# Patient Record
Sex: MALE | NOT HISPANIC OR LATINO | URBAN - METROPOLITAN AREA
[De-identification: names, ages, dates, MRNs, and addresses within clinical notes are randomized per-mention and may not be internally consistent; named-entity substitution may affect disease eponyms.]

---

## 2018-10-11 NOTE — PATIENT DISCUSSION
Cataract APPEARS VISUALLY SIGNIFICANT and patient advised to SEE MÓNICA GARCIA for evaluation and treatment.

## 2021-09-27 ENCOUNTER — NEW CATARACT EVAL (OUTPATIENT)
Dept: URBAN - METROPOLITAN AREA CLINIC 53 | Facility: CLINIC | Age: 81
End: 2021-09-27

## 2021-09-27 DIAGNOSIS — E11.9: ICD-10-CM

## 2021-09-27 DIAGNOSIS — H25.11: ICD-10-CM

## 2021-09-27 PROCEDURE — 99204 OFFICE O/P NEW MOD 45 MIN: CPT

## 2021-09-27 ASSESSMENT — VISUAL ACUITY
OS_CC: 20/40-1
OD_SC: 20/300
OS_GLARE: 20/30
OU_CC: 20/25-1
OS_GLARE: 20/25
OD_CC: 20/40-1
OS_SC: 20/100-1
OD_SC: J1+ @ 12IN
OS_PH: 20/60
OD_GLARE: 20/40
OD_PH: 20/80
OD_GLARE: 20/30

## 2021-09-27 ASSESSMENT — KERATOMETRY
OS_K1POWER_DIOPTERS: 42.50
OD_K1POWER_DIOPTERS: 42.75
OS_K2POWER_DIOPTERS: 43.50
OS_AXISANGLE_DEGREES: 117
OD_K2POWER_DIOPTERS: 43.25
OS_AXISANGLE2_DEGREES: 27
OD_AXISANGLE2_DEGREES: 43
OD_AXISANGLE_DEGREES: 133

## 2021-09-27 ASSESSMENT — TONOMETRY
OD_IOP_MMHG: 10
OS_IOP_MMHG: 10

## 2021-10-06 ENCOUNTER — BIOMETRY (OUTPATIENT)
Dept: URBAN - METROPOLITAN AREA CLINIC 52 | Facility: CLINIC | Age: 81
End: 2021-10-06

## 2021-10-06 DIAGNOSIS — H25.11: ICD-10-CM

## 2021-10-06 PROCEDURE — TOPOIOL CORNEAL TOPOGRAPHY-PREMIUM IOL

## 2021-10-06 PROCEDURE — 92136 OPHTHALMIC BIOMETRY: CPT

## 2021-10-06 ASSESSMENT — KERATOMETRY
OD_AXISANGLE_DEGREES: 023
OD_K1POWER_DIOPTERS: 43.25
OD_AXISANGLE2_DEGREES: 113
OD_K2POWER_DIOPTERS: 42.87

## 2022-12-08 ENCOUNTER — APPOINTMENT (RX ONLY)
Dept: URBAN - METROPOLITAN AREA CLINIC 81 | Facility: CLINIC | Age: 82
Setting detail: DERMATOLOGY
End: 2022-12-08

## 2022-12-08 DIAGNOSIS — D18.0 HEMANGIOMA: ICD-10-CM

## 2022-12-08 DIAGNOSIS — L81.4 OTHER MELANIN HYPERPIGMENTATION: ICD-10-CM

## 2022-12-08 DIAGNOSIS — D22 MELANOCYTIC NEVI: ICD-10-CM

## 2022-12-08 DIAGNOSIS — L82.1 OTHER SEBORRHEIC KERATOSIS: ICD-10-CM

## 2022-12-08 PROBLEM — C44.529 SQUAMOUS CELL CARCINOMA OF SKIN OF OTHER PART OF TRUNK: Status: ACTIVE | Noted: 2022-12-08

## 2022-12-08 PROBLEM — D22.5 MELANOCYTIC NEVI OF TRUNK: Status: ACTIVE | Noted: 2022-12-08

## 2022-12-08 PROBLEM — D18.01 HEMANGIOMA OF SKIN AND SUBCUTANEOUS TISSUE: Status: ACTIVE | Noted: 2022-12-08

## 2022-12-08 PROCEDURE — 99203 OFFICE O/P NEW LOW 30 MIN: CPT

## 2022-12-08 PROCEDURE — ? COUNSELING

## 2022-12-08 PROCEDURE — ? TREATMENT REGIMEN

## 2022-12-08 PROCEDURE — ? PHOTO-DOCUMENTATION

## 2022-12-08 PROCEDURE — ? DIAGNOSIS COMMENT

## 2022-12-08 ASSESSMENT — LOCATION ZONE DERM: LOCATION ZONE: TRUNK

## 2022-12-08 ASSESSMENT — LOCATION SIMPLE DESCRIPTION DERM: LOCATION SIMPLE: ABDOMEN

## 2022-12-08 ASSESSMENT — LOCATION DETAILED DESCRIPTION DERM
LOCATION DETAILED: PERIUMBILICAL SKIN
LOCATION DETAILED: EPIGASTRIC SKIN

## 2022-12-08 NOTE — PROCEDURE: DIAGNOSIS COMMENT
Comment: Pt is currently on Libtayo for treatment of squamous cell carcinoma. Pt started treatment yesterday, will continue to monitor.
Render Risk Assessment In Note?: no
Detail Level: Simple